# Patient Record
Sex: FEMALE | Race: WHITE | NOT HISPANIC OR LATINO | URBAN - METROPOLITAN AREA
[De-identification: names, ages, dates, MRNs, and addresses within clinical notes are randomized per-mention and may not be internally consistent; named-entity substitution may affect disease eponyms.]

---

## 2019-09-24 ENCOUNTER — AMBULATORY - HEALTHEAST (OUTPATIENT)
Dept: MULTI SPECIALTY CLINIC | Facility: CLINIC | Age: 44
End: 2019-09-24

## 2019-09-24 LAB
HPV_EXT - HISTORICAL: NORMAL
PAP SMEAR - HIM PATIENT REPORTED: NORMAL

## 2019-10-01 ENCOUNTER — AMBULATORY - HEALTHEAST (OUTPATIENT)
Dept: FAMILY MEDICINE | Facility: CLINIC | Age: 44
End: 2019-10-01

## 2019-10-01 RX ORDER — RIZATRIPTAN BENZOATE 10 MG/1
10 TABLET, ORALLY DISINTEGRATING ORAL PRN
Status: SHIPPED | COMMUNITY
Start: 2015-01-22

## 2019-10-29 ENCOUNTER — OFFICE VISIT - HEALTHEAST (OUTPATIENT)
Dept: FAMILY MEDICINE | Facility: CLINIC | Age: 44
End: 2019-10-29

## 2019-10-29 DIAGNOSIS — R73.03 PREDIABETES: ICD-10-CM

## 2019-10-29 DIAGNOSIS — R87.618 NON-ATYPICAL ENDOMETRIAL CELLS ON CERVICAL PAP SMEAR: ICD-10-CM

## 2019-10-29 DIAGNOSIS — E66.01 MORBID OBESITY (H): ICD-10-CM

## 2019-10-29 DIAGNOSIS — Z98.84 STATUS POST BARIATRIC SURGERY: ICD-10-CM

## 2019-10-29 DIAGNOSIS — Z76.89 ENCOUNTER TO ESTABLISH CARE: ICD-10-CM

## 2019-10-29 DIAGNOSIS — E66.811 OBESITY (BMI 30.0-34.9): ICD-10-CM

## 2019-10-29 DIAGNOSIS — F32.A DEPRESSIVE DISORDER: ICD-10-CM

## 2019-10-29 DIAGNOSIS — E55.9 HYPOVITAMINOSIS D: ICD-10-CM

## 2019-10-29 LAB
CHOLEST SERPL-MCNC: 267 MG/DL
FASTING STATUS PATIENT QL REPORTED: ABNORMAL
HBA1C MFR BLD: 6 % (ref 3.5–6)
HDLC SERPL-MCNC: 56 MG/DL
LDLC SERPL CALC-MCNC: 190 MG/DL
TRIGL SERPL-MCNC: 103 MG/DL
TSH SERPL DL<=0.005 MIU/L-ACNC: 1.32 UIU/ML (ref 0.3–5)
VIT B12 SERPL-MCNC: 428 PG/ML (ref 213–816)

## 2019-10-29 RX ORDER — IBUPROFEN 600 MG/1
600 TABLET, FILM COATED ORAL EVERY 6 HOURS PRN
Status: SHIPPED | COMMUNITY
Start: 2013-04-16

## 2019-10-29 RX ORDER — ATORVASTATIN CALCIUM 10 MG/1
10 TABLET, FILM COATED ORAL AT BEDTIME
Status: SHIPPED | COMMUNITY
Start: 2019-02-07 | End: 2020-02-07

## 2019-10-29 ASSESSMENT — ANXIETY QUESTIONNAIRES
3. WORRYING TOO MUCH ABOUT DIFFERENT THINGS: NOT AT ALL
IF YOU CHECKED OFF ANY PROBLEMS ON THIS QUESTIONNAIRE, HOW DIFFICULT HAVE THESE PROBLEMS MADE IT FOR YOU TO DO YOUR WORK, TAKE CARE OF THINGS AT HOME, OR GET ALONG WITH OTHER PEOPLE: SOMEWHAT DIFFICULT
7. FEELING AFRAID AS IF SOMETHING AWFUL MIGHT HAPPEN: NOT AT ALL
GAD7 TOTAL SCORE: 3
6. BECOMING EASILY ANNOYED OR IRRITABLE: MORE THAN HALF THE DAYS
5. BEING SO RESTLESS THAT IT IS HARD TO SIT STILL: NOT AT ALL
1. FEELING NERVOUS, ANXIOUS, OR ON EDGE: NOT AT ALL
2. NOT BEING ABLE TO STOP OR CONTROL WORRYING: NOT AT ALL
4. TROUBLE RELAXING: SEVERAL DAYS

## 2019-10-29 ASSESSMENT — MIFFLIN-ST. JEOR: SCORE: 1549.16

## 2019-10-29 ASSESSMENT — PATIENT HEALTH QUESTIONNAIRE - PHQ9: SUM OF ALL RESPONSES TO PHQ QUESTIONS 1-9: 9

## 2019-10-30 LAB
25(OH)D3 SERPL-MCNC: 19.9 NG/ML (ref 30–80)
25(OH)D3 SERPL-MCNC: 19.9 NG/ML (ref 30–80)

## 2019-11-18 ENCOUNTER — AMBULATORY - HEALTHEAST (OUTPATIENT)
Dept: FAMILY MEDICINE | Facility: CLINIC | Age: 44
End: 2019-11-18

## 2019-11-18 DIAGNOSIS — R87.810 CERVICAL HIGH RISK HPV (HUMAN PAPILLOMAVIRUS) TEST POSITIVE: ICD-10-CM

## 2019-11-18 DIAGNOSIS — F32.A DEPRESSIVE DISORDER: ICD-10-CM

## 2019-11-18 DIAGNOSIS — E66.09 CLASS 2 OBESITY DUE TO EXCESS CALORIES WITHOUT SERIOUS COMORBIDITY WITH BODY MASS INDEX (BMI) OF 35.0 TO 35.9 IN ADULT: ICD-10-CM

## 2019-11-18 DIAGNOSIS — E66.812 CLASS 2 OBESITY DUE TO EXCESS CALORIES WITHOUT SERIOUS COMORBIDITY WITH BODY MASS INDEX (BMI) OF 35.0 TO 35.9 IN ADULT: ICD-10-CM

## 2019-11-18 DIAGNOSIS — R87.613 HIGH GRADE INTREPITH LESION CYTO SMR CRVX (HGSIL): ICD-10-CM

## 2019-11-18 DIAGNOSIS — R73.03 PREDIABETES: ICD-10-CM

## 2019-11-18 DIAGNOSIS — R87.618 NON-ATYPICAL ENDOMETRIAL CELLS ON CERVICAL PAP SMEAR: ICD-10-CM

## 2019-11-18 RX ORDER — DULOXETINE 40 MG/1
40 CAPSULE, DELAYED RELEASE ORAL DAILY
Qty: 90 CAPSULE | Refills: 1 | Status: SHIPPED
Start: 2019-11-18 | End: 2020-11-17

## 2019-11-18 RX ORDER — TOPIRAMATE 50 MG/1
50 TABLET, FILM COATED ORAL 2 TIMES DAILY
Qty: 60 TABLET | Refills: 2 | Status: SHIPPED | OUTPATIENT
Start: 2019-11-18

## 2019-11-18 RX ORDER — METFORMIN HCL 500 MG
500 TABLET, EXTENDED RELEASE 24 HR ORAL
Qty: 90 TABLET | Refills: 1 | Status: SHIPPED | OUTPATIENT
Start: 2019-11-18

## 2019-11-19 LAB
LAB AP CHARGES (HE HISTORICAL CONVERSION): NORMAL
PATH REPORT.COMMENTS IMP SPEC: NORMAL
PATH REPORT.COMMENTS IMP SPEC: NORMAL
PATH REPORT.FINAL DX SPEC: NORMAL
PATH REPORT.GROSS SPEC: NORMAL
PATH REPORT.MICROSCOPIC SPEC OTHER STN: NORMAL
PATH REPORT.RELEVANT HX SPEC: NORMAL
RESULT FLAG (HE HISTORICAL CONVERSION): NORMAL

## 2019-11-20 ENCOUNTER — COMMUNICATION - HEALTHEAST (OUTPATIENT)
Dept: FAMILY MEDICINE | Facility: CLINIC | Age: 44
End: 2019-11-20

## 2019-11-26 ENCOUNTER — COMMUNICATION - HEALTHEAST (OUTPATIENT)
Dept: FAMILY MEDICINE | Facility: CLINIC | Age: 44
End: 2019-11-26

## 2019-12-09 ENCOUNTER — COMMUNICATION - HEALTHEAST (OUTPATIENT)
Dept: ADMINISTRATIVE | Facility: CLINIC | Age: 44
End: 2019-12-09

## 2019-12-11 ENCOUNTER — COMMUNICATION - HEALTHEAST (OUTPATIENT)
Dept: FAMILY MEDICINE | Facility: CLINIC | Age: 44
End: 2019-12-11

## 2019-12-11 DIAGNOSIS — F41.0 ANXIETY ATTACK: ICD-10-CM

## 2019-12-11 RX ORDER — ALPRAZOLAM 0.25 MG
0.125-0.25 TABLET ORAL 3 TIMES DAILY PRN
Qty: 15 TABLET | Refills: 0 | Status: SHIPPED | OUTPATIENT
Start: 2019-12-11

## 2020-03-09 ENCOUNTER — COMMUNICATION - HEALTHEAST (OUTPATIENT)
Dept: ADMINISTRATIVE | Facility: CLINIC | Age: 45
End: 2020-03-09

## 2020-03-09 ENCOUNTER — COMMUNICATION - HEALTHEAST (OUTPATIENT)
Dept: FAMILY MEDICINE | Facility: CLINIC | Age: 45
End: 2020-03-09

## 2020-03-09 DIAGNOSIS — R87.618 NON-ATYPICAL ENDOMETRIAL CELLS ON CERVICAL PAP SMEAR: ICD-10-CM

## 2020-03-09 DIAGNOSIS — R87.613 HIGH GRADE INTREPITH LESION CYTO SMR CRVX (HGSIL): ICD-10-CM

## 2021-05-25 ENCOUNTER — RECORDS - HEALTHEAST (OUTPATIENT)
Dept: ADMINISTRATIVE | Facility: CLINIC | Age: 46
End: 2021-05-25

## 2021-05-26 ASSESSMENT — PATIENT HEALTH QUESTIONNAIRE - PHQ9: SUM OF ALL RESPONSES TO PHQ QUESTIONS 1-9: 9

## 2021-05-28 ASSESSMENT — ANXIETY QUESTIONNAIRES: GAD7 TOTAL SCORE: 3

## 2021-06-02 NOTE — PROGRESS NOTES
Assessment/plan   Carolina No is a 44 y.o. female who is New  patient to my practice here with   Chief Complaint   Patient presents with     Establish Care        Carolina was seen today for establish care.    Diagnoses and all orders for this visit:    Encounter to establish care  -     Lipid Cascade RANDOM    Non-atypical endometrial cells on cervical Pap smear  Comments:   H/o abnormal pap, last pap ASCUS HPV positive, s/p colpo 11/2017. most recent pap 9/2019 ASCUS with HPV +. need colp ASAP  Will make the romulo by next wk     Prediabetes  -     Glycosylated Hemoglobin A1c    Hypovitaminosis D    Depressive disorder  -     DULoxetine (CYMBALTA) 20 MG capsule; Take 1 capsule (20 mg total) by mouth 2 (two) times a day.  -     Thyroid Stimulating Hormone (TSH)    Obesity (BMI 30.0-34.9)    Status post bariatric surgery  -     Vitamin D, Total (25-Hydroxy)  -     Vitamin B12    Morbid obesity (H)        Increase the Cymbalta to 20 mg twice a day we can easily go up to 60 mg/day as needed  We will do basic labs for her bariatric surgery follow-up also as she not able to do routine follow-ups  A1c is 6.0 will let her know if willing will recommend to go back on her metformin which will also help her weight loss or weight maintenance.  To make appointment for colposcopy as soon as she can    Subjective:      HPI: Carolina No is a 44 y.o. female is here to establish care and get her blood work done patient was seen at her previous clinic at [facility] and had a complete physical done with a Pap smear but at the same time she found out her insurance has changed and she has to pay out-of-pocket for that last physical so she decided not to do the blood work during that visit  History of bariatric surgery with 70 to 80 pound weight loss but then gained 20 of that weight back in last one y    Prediabetes : with wt loss  Improve her A1c from 7.5 to 6.4 more in the prediabetic range not doing any metformin since  Last one yr  But  willing to go back on metformin if A1c is going up again    Lab Results   Component Value Date    HGBA1C 6.0 10/29/2019     Depression /anxiety : He feels most of the time is quite situational work-related and sometimes could be family.  Patient work as a OR nurse at [employer]  Started at the [employer] in last 1 year and since then the insurance change.  Patient struggling with depression since she was early teens had used lot of different SSRIs in her life last one was Effexor which she was using in the last 1 year but changed to Cymbalta 20 mg/day more to help with her chronic aches and pain also she feel it works definitely better than her Effexor but depression still not well controlled    No data recorded  No data recorded    I have personally reviewed the patient's allergies, medications, past medical history, family history, social history, rooming notes and problem list in detail and updated the patient record as necessary.      Past Medical History:   Diagnosis Date     Depression      No past surgical history on file.  Patient has no known allergies.  Current Outpatient Medications   Medication Sig Dispense Refill     ALPRAZolam (XANAX) 0.25 MG tablet Take 0.125-0.25 mg by mouth.       atorvastatin (LIPITOR) 10 MG tablet Take 10 mg by mouth at bedtime.       calcium carbonate (SUPER CALCIUM ORAL) Take by mouth.       cyanocobalamin 1000 MCG tablet Place 1,000 mcg under the tongue once a week.       DULoxetine (CYMBALTA) 20 MG capsule Take 1 capsule (20 mg total) by mouth 2 (two) times a day. 180 capsule 11     ibuprofen (ADVIL,MOTRIN) 600 MG tablet Take 600 mg by mouth every 6 (six) hours as needed.       MULTIVITAMIN WITH IRON ORAL Take by mouth.       rizatriptan (MAXALT-MLT) 10 MG disintegrating tablet Take 10 mg by mouth as needed. Take 1 Tab by mouth once as needed for up to 1 dose. at onset of migraine. May repeat in 2 hr if needed up to 30mg in 24 hr & MAX use 5 d/mo       No current  "facility-administered medications for this visit.      No family history on file.    Patient Active Problem List   Diagnosis     Migraine     Depressive disorder     Non-atypical endometrial cells on cervical Pap smear     Cervical high risk HPV (human papillomavirus) test positive     Prediabetes     Hypovitaminosis D     Insomnia, unspecified     Status post bariatric surgery     Obesity (BMI 35.0-39.9) with comorbidity (H)       Review of Systems   12 point comprehensive review of systems was negative except as noted and HPI     Social History     Patient does not qualify to have social determinant information on file (likely too young).   Social History Narrative     Not on file       Objective:     Vitals:    10/29/19 0920   BP: 102/74   Pulse: 64   Weight: 205 lb 3.2 oz (93.1 kg)   Height: 5' 3.58\" (1.615 m)       Physical Exam:   Physical Exam:  General Appearance:  Appears comfortable, Alert, cooperative, no distress,   Head: Normocephalic, without obvious abnormality, atraumatic  Eyes: PERRL, conjunctiva/corneas clear, EOM's intact, both eyes             Nose: Nares normal, no drainage   Throat: Lips, mucosa, and tongue normal; teeth and gums normal  Neck: Supple, symmetrical, trachea midline, no adenopathy;                      Lungs: Clear to auscultation bilaterally, respirations unlabored  Chest Wall: No tenderness or deformity  Heart: Regular rate and rhythm, S1 and S2 normal, no murmur, rubs or gallop  Abdomen: Soft, non-tender, bowel sounds active all four quadrants,   no masses, no organomegaly  Extremities: Extremities normal, atraumatic, no cyanosis or edema  Pulses: DP pulses are 1-2+ bilat.    Skin: no rashes or lesions  Neurologic: normal and equal strength bilat in upper and lower extremities        This note has been dictated using voice recognition software. Any grammatical or context distortions are unintentional and inherent to the software  Magali Ann MD      "

## 2021-06-03 VITALS
BODY MASS INDEX: 35.39 KG/M2 | WEIGHT: 203.5 LBS | DIASTOLIC BLOOD PRESSURE: 76 MMHG | HEART RATE: 72 BPM | SYSTOLIC BLOOD PRESSURE: 108 MMHG

## 2021-06-03 VITALS
DIASTOLIC BLOOD PRESSURE: 74 MMHG | SYSTOLIC BLOOD PRESSURE: 102 MMHG | HEART RATE: 64 BPM | HEIGHT: 64 IN | WEIGHT: 205.2 LBS | BODY MASS INDEX: 35.03 KG/M2

## 2021-06-03 NOTE — TELEPHONE ENCOUNTER
Patient Returning Call  Reason for call:  Patient is returning a call she received from Magali Ann MD.  Information relayed to patient:  Message from Magali Ann MD below regarding biopsy results relayed to the patient.     Message can also be found in the test results tab link to the result.  Patient has additional questions:  No  If YES, what are your questions/concerns:  N/a  Okay to leave a detailed message?: No call back needed   Notes recorded by Magali Ann MD on 11/20/2019 at 12:27 PM CST  Dear Carolina,    It was a pleasure to see you in the office the other day.   I also left message for you, your one of the biopsy result did show high grade lesion I have taken the liberty to start the referral process so you can be seen by GYN to discuss LEEP procedure or any other option.  Please feel free to call back for any concerns or questions.    Thank you again for allowing me to be a part of your care!    Sincerely,      Magali Ann MD

## 2021-06-03 NOTE — PROGRESS NOTES
Colposcopy Procedure Note    Carolina No is a 44 y.o. female is here today for a Colposcopy.  Carolina was seen today for colposcopy and medication management.    Diagnoses and all orders for this visit:    Non-atypical endometrial cells on cervical Pap smear  Comments:  Comments:   H/o abnormal pap, last pap ASCUS HPV positive, s/p colpo 11/2017. most recent pap 9/2019 ASCUS with HPV +. need colp ASAP  Will make the romulo by next  Orders:  -     Surgical Pathology Exam  -     Surgical Pathology Exam  -     Surgical Pathology Exam    Cervical high risk HPV (human papillomavirus) test positive  -     Surgical Pathology Exam  -     Surgical Pathology Exam  -     Surgical Pathology Exam    Depressive disorder  -     DULoxetine 40 mg CpDR; Take 40 mg by mouth 2 (two) times a day.    Class 2 obesity due to excess calories without serious comorbidity with body mass index (BMI) of 35.0 to 35.9 in adult    Prediabetes  -     topiramate (TOPAMAX) 50 MG tablet; Take 1 tablet (50 mg total) by mouth 2 (two) times a day.    meds also discussed today including staring back metformin for A1c of 6.0 with diet and exercise  Increase duloxetine to 40 mg to help depression   topamax for wt loss     Indications: Pap smear 6 months ago showed: no abnormalities and ASCUS with POSITIVE high risk HPV. The prior pap showed no abnormalities and low-grade squamous intraepithelial neoplasia (LGSIL - encompassing HPV,mild dysplasia,ALEXUS I).  Prior cervical/vaginal disease: ALEXUS 3. Prior cervical treatment: cryosurgery.LEEP  Procedure Details   The reason for procedure is explained and informed consent obtained.    Speculum placed in vagina and visualization of cervix achieved, cervix swabbed with 3% acetic acid solution. Cervix is also swabbed with Lugol's solution. Procedure detailsCervix swabbed with Lugol's solution, Endocervical curettage performed, Cervical biopsies taken at 3 and 9 o'clock and Hemostasis achieved with Monsel's  solution    Findings:  Cervix: acetowhite lesion(s) noted at 3 and 9 o'clock, mosaicism noted at 3and 9 o'clock, HPV changes noted at 3 and 9 o'clock and SCJ visualized - lesion at 3 and 9 o'clock;       Specimens: See orders    Complications: none.    Plan:  Specimens labelled and sent to Pathology. Role of HPV in causing cervical pap smear abnormalities, dysplasia, and cancer is reviewed with the patient. She will be contacted in a few days with biopsy results and recommendations.    Magali Ann

## 2021-06-16 PROBLEM — E66.01 MORBID OBESITY (H): Status: ACTIVE | Noted: 2019-10-29

## 2021-06-16 PROBLEM — Z98.84 STATUS POST BARIATRIC SURGERY: Status: ACTIVE | Noted: 2017-05-22

## 2021-06-17 NOTE — PATIENT INSTRUCTIONS - HE
Patient Instructions by Magali Ann MD at 11/18/2019 10:20 AM     Author: Magali Ann MD Service: -- Author Type: Physician    Filed: 11/18/2019 10:52 AM Encounter Date: 11/18/2019 Status: Signed    : Magali Ann MD (Physician)         Colposcopy   Colposcopy is a procedure that gives your health care provider a magnified view of the cervix. It is done using a lighted microscope called a colposcope. In most cases, a sample of cervical cells is taken during a biopsy. The sample can then be studied in a lab. If any problems are found, you and your health care provider will discuss treatment options. It usually takes less thanÂ 30 minutes, and you can often go back to your normal routine right away.   Reasons for the Procedure   Colposcopy is usually done as a follow-up exam to help find the cause of an abnormal Pap test. Abnormal Pap tests are often due to an HPV (human papilloma virus) infection. HPV is a large family of viruses. HPV can be passed from person to person through sex. HPVÂ can cause genital warts. It can also cause changes in cervical cells. Colposcopy is also used to assess other problems. These include pain or bleeding during sexual intercourse, or a lesion on the vulva or vagina.       What Are the Risks?   Problems after colposcopy are very rare, but can include:   Bleeding (if a biopsy is done)   Infection    Getting Ready for the Procedure   Colposcopy is normally done in your health care providers office. It will be scheduled for a time when youre not having your menstrual period. You may be asked to sign a form giving your consent to have the procedure. A day or two before the procedure, your health care provider may also ask you to:   Avoid sexual intercourse.   Stop using tampons.   Avoid using creams or other vaginal medications.   Avoid douching.   Take over-the-counter pain medications an hour or two before the procedure.    During Colposcopy   You will be asked to lie on an  exam table with your knees bent, just as you do for a Pap test.   An instrument called a speculum is inserted into the vagina to hold it open.   A vinegar solution is applied to the cervix to make the abnormal cells easier to see. You may feel pressure or a slight burning for a few moments. In some cases, the cervix may be numbed first with an anesthetic.   The cervix is viewed through the colposcope, which is placed outside the vagina.   If your health care provider sees abnormal areas on the cervix, a biopsy will be done. The tissue sample is sent to a lab for study.   An endocervical curettage may also be done at the time of colposcopy. In this procedure, an instrument is put into the endocervical canal to get a sample of cells from the endocervix. This area can't be seen with a colposcope.Â   You may feel slight pinching or cramping during the biopsy. Medication may be applied to the biopsy site to stop bleeding.  After the Procedure   If you feel lightheaded or dizzy, you can rest on the table until youre ready to get dressed.   If a biopsy was done, you may have mild cramping or light bleeding for a few days. You may also have discharge from the medication used to stop bleeding at the biopsy site.   Use pads, not tampons, for at least the first 24 hours.   If you have any discomfort, over-the-counter pain medication can provide relief.   Ask your health care provider when you can resume sexual intercourse.  Follow-Up   If a biopsy was done, your health care provider will get the lab report in a week or 2. You and your health care provider can then discuss the results. In some cases, you may be scheduled for further tests or treatment. Be sure to keep follow-up appointments with your health care provider.       Call your health care provider if you have:   Heavy vaginal bleeding (more than a pad an hour for 2 hours).   Severe or increasing pelvic pain.   A fever overÂ 100.4Â FÂ (38Â C)   Foul-smelling or  unusual vaginal discharge.

## 2021-07-03 NOTE — ADDENDUM NOTE
Addendum Note by Magali Ann MD at 11/18/2019 10:20 AM     Author: Magali Ann MD Service: -- Author Type: Physician    Filed: 11/20/2019 12:25 PM Encounter Date: 11/18/2019 Status: Signed    : Magali Ann MD (Physician)    Addended by: MAGALI ANN on: 11/20/2019 12:25 PM        Modules accepted: Orders

## 2021-08-15 ENCOUNTER — HEALTH MAINTENANCE LETTER (OUTPATIENT)
Age: 46
End: 2021-08-15

## 2021-10-10 ENCOUNTER — HEALTH MAINTENANCE LETTER (OUTPATIENT)
Age: 46
End: 2021-10-10

## 2022-09-18 ENCOUNTER — HEALTH MAINTENANCE LETTER (OUTPATIENT)
Age: 47
End: 2022-09-18

## 2023-10-08 ENCOUNTER — HEALTH MAINTENANCE LETTER (OUTPATIENT)
Age: 48
End: 2023-10-08